# Patient Record
Sex: FEMALE | Race: WHITE | NOT HISPANIC OR LATINO
[De-identification: names, ages, dates, MRNs, and addresses within clinical notes are randomized per-mention and may not be internally consistent; named-entity substitution may affect disease eponyms.]

---

## 2022-04-14 VITALS
WEIGHT: 238.1 LBS | HEART RATE: 70 BPM | HEIGHT: 69 IN | TEMPERATURE: 97.3 F | DIASTOLIC BLOOD PRESSURE: 70 MMHG | BODY MASS INDEX: 35.27 KG/M2 | SYSTOLIC BLOOD PRESSURE: 120 MMHG

## 2022-08-31 ENCOUNTER — APPOINTMENT (OUTPATIENT)
Dept: CARDIOLOGY | Facility: CLINIC | Age: 72
End: 2022-08-31

## 2022-08-31 PROCEDURE — 93880 EXTRACRANIAL BILAT STUDY: CPT

## 2022-09-02 ENCOUNTER — APPOINTMENT (OUTPATIENT)
Dept: CARDIOLOGY | Facility: CLINIC | Age: 72
End: 2022-09-02

## 2022-09-02 PROCEDURE — 93306 TTE W/DOPPLER COMPLETE: CPT

## 2022-09-13 ENCOUNTER — APPOINTMENT (OUTPATIENT)
Dept: CARDIOLOGY | Facility: CLINIC | Age: 72
End: 2022-09-13

## 2022-09-13 VITALS — HEIGHT: 69 IN | BODY MASS INDEX: 35.33 KG/M2 | WEIGHT: 238.56 LBS

## 2022-09-13 PROCEDURE — 99205 OFFICE O/P NEW HI 60 MIN: CPT

## 2022-09-26 ENCOUNTER — NON-APPOINTMENT (OUTPATIENT)
Age: 72
End: 2022-09-26

## 2022-09-26 DIAGNOSIS — I44.60 UNSPECIFIED FASCICULAR BLOCK: ICD-10-CM

## 2022-11-21 ENCOUNTER — NON-APPOINTMENT (OUTPATIENT)
Age: 72
End: 2022-11-21

## 2022-11-21 DIAGNOSIS — A00.9 CHOLERA, UNSPECIFIED: ICD-10-CM

## 2022-11-21 DIAGNOSIS — Z87.891 PERSONAL HISTORY OF NICOTINE DEPENDENCE: ICD-10-CM

## 2023-01-25 ENCOUNTER — APPOINTMENT (OUTPATIENT)
Dept: CARDIOLOGY | Facility: CLINIC | Age: 73
End: 2023-01-25
Payer: MEDICARE

## 2023-01-25 VITALS — BODY MASS INDEX: 34.07 KG/M2 | HEIGHT: 69 IN | WEIGHT: 230 LBS

## 2023-01-25 PROCEDURE — 99214 OFFICE O/P EST MOD 30 MIN: CPT

## 2023-01-25 NOTE — HISTORY OF PRESENT ILLNESS
[FreeTextEntry1] : Pt with HTN, VENOUS INSUFFIENCY AND CAD OSTIAL LCX 20%, MILD AORTIC STENOSIS, ORTHOSTATIC HYPOTENSION IN THE PAST,  PPM 2010, Restrictive lung Disease, DD2 2022, CORY. \par STATIN MYALGIA ON PRALUENT \par \par pt with poison ivy had been on steroids and problem all summer, pt with grade 2 dd and says not sob, pt not very active for the last 2 months due ot heat. \par pt denies dizziness in june and told due to inner ear. \par \par LDL 95 ON PRALUENT \par 9/2/22: lvef 60%, dd1, severe LAE, severe calcified PMVL mild MR, E brianna: 2.1 m/s TR brianna: 2.9 m/s MILD AS. \par 8/22: > 70% LICA, 50-69% ROXANNE\par \par pt did not get CTA yet and called a couple times and not sure why did not get CTA. pt has mild AS before and murmur. pt says bp high with PMD and bp at home was 136/67 and bp now in office 130/70. \par pt did not change medications. \par \par pt had a rash to praluent shot, pt gets a cellulitis from it, pt found it is from praluent. \par LDL 95 but took last praluent dose 1 week prior. PT STOPPED PRALUENT AND RASH WENT AWAY, ITCHY.

## 2023-01-25 NOTE — PHYSICAL EXAM
[Normal S1, S2] : normal S1, S2 [Murmur] : murmur [Clear Lung Fields] : clear lung fields [de-identified] : GERARDO

## 2023-01-25 NOTE — PHYSICAL EXAM
[Normal S1, S2] : normal S1, S2 [Murmur] : murmur [Clear Lung Fields] : clear lung fields [de-identified] : GERARDO

## 2023-01-25 NOTE — CARDIOLOGY SUMMARY
[de-identified] : 9/2/22: lvef 60%, dd1, severe LAE, severe calcified PMVL mild MR, E ve: 2.1 m/s TR brianna: 2.9 m/s MILD AS.

## 2023-01-25 NOTE — CARDIOLOGY SUMMARY
[de-identified] : 9/2/22: lvef 60%, dd1, severe LAE, severe calcified PMVL mild MR, E ve: 2.1 m/s TR brianna: 2.9 m/s MILD AS.

## 2023-01-25 NOTE — DISCUSSION/SUMMARY
[FreeTextEntry1] : pt with carotid disease, will get CTA and send to dr. french if significant. \par START ZETIA AS STILL HIGH ON PRALUENT

## 2023-01-25 NOTE — DISCUSSION/SUMMARY
[FreeTextEntry1] : pt with carotid disease, will get CTA and send to dr. french if significant. \par START ZETIA AS STILL HIGH ON PRALUENT \par \par pt diD not start ezetimbe, \par HAD RASH WITH PRALUENT PROVEN WITH DERMATOLOGY, \par statin myalgia was reason pt was on praluent \par will try to get leqvio as CAD and carotid dz \par will need CTA of carotids. \par

## 2023-01-25 NOTE — HISTORY OF PRESENT ILLNESS
[FreeTextEntry1] : Pt with HTN, VENOUS INSUFFIENCY AND CAD OSTIAL LCX 20%, MILD AORTIC STENOSIS, ORTHOSTATIC HYPOTENSION IN THE PAST,  PPM 2010, Restrictive lung Disease, DD2 2022, CORY. \par STATIN MYALGIA ON PRALUENT \par \par pt with poison ivy had been on steroids and problem all summer, pt with grade 2 dd and says not sob, pt not very active for the last 2 months due ot heat. \par pt denies dizziness in june and told due to inner ear. \par \par LDL 95 ON PRALUENT \par 9/2/22: lvef 60%, dd1, severe LAE, severe calcified PMVL mild MR, E brianna: 2.1 m/s TR brianna: 2.9 m/s MILD AS. \par 8/22: > 70% LICA, 50-69% ROXANNE

## 2023-02-02 ENCOUNTER — NON-APPOINTMENT (OUTPATIENT)
Age: 73
End: 2023-02-02

## 2023-02-06 ENCOUNTER — OUTPATIENT (OUTPATIENT)
Dept: OUTPATIENT SERVICES | Facility: HOSPITAL | Age: 73
LOS: 1 days | End: 2023-02-06
Payer: MEDICARE

## 2023-02-06 DIAGNOSIS — I25.10 ATHEROSCLEROTIC HEART DISEASE OF NATIVE CORONARY ARTERY WITHOUT ANGINA PECTORIS: ICD-10-CM

## 2023-02-06 DIAGNOSIS — I65.23 OCCLUSION AND STENOSIS OF BILATERAL CAROTID ARTERIES: ICD-10-CM

## 2023-02-06 DIAGNOSIS — Z00.8 ENCOUNTER FOR OTHER GENERAL EXAMINATION: ICD-10-CM

## 2023-02-06 PROCEDURE — 70498 CT ANGIOGRAPHY NECK: CPT | Mod: 26

## 2023-02-06 PROCEDURE — 70498 CT ANGIOGRAPHY NECK: CPT

## 2023-02-07 DIAGNOSIS — I25.10 ATHEROSCLEROTIC HEART DISEASE OF NATIVE CORONARY ARTERY WITHOUT ANGINA PECTORIS: ICD-10-CM

## 2023-02-07 DIAGNOSIS — I65.23 OCCLUSION AND STENOSIS OF BILATERAL CAROTID ARTERIES: ICD-10-CM

## 2023-03-13 ENCOUNTER — APPOINTMENT (OUTPATIENT)
Dept: CARDIOLOGY | Facility: CLINIC | Age: 73
End: 2023-03-13
Payer: MEDICARE

## 2023-03-13 VITALS
HEIGHT: 69 IN | WEIGHT: 236 LBS | BODY MASS INDEX: 34.96 KG/M2 | TEMPERATURE: 97.7 F | DIASTOLIC BLOOD PRESSURE: 80 MMHG | SYSTOLIC BLOOD PRESSURE: 140 MMHG | RESPIRATION RATE: 15 BRPM | HEART RATE: 62 BPM | OXYGEN SATURATION: 97 %

## 2023-03-13 PROCEDURE — 99204 OFFICE O/P NEW MOD 45 MIN: CPT

## 2023-03-13 RX ORDER — EVOLOCUMAB 140 MG/ML
140 INJECTION, SOLUTION SUBCUTANEOUS
Refills: 0 | Status: DISCONTINUED | COMMUNITY
End: 2023-03-13

## 2023-03-13 RX ORDER — ALIROCUMAB 150 MG/ML
150 INJECTION, SOLUTION SUBCUTANEOUS
Refills: 0 | Status: DISCONTINUED | COMMUNITY
End: 2023-03-13

## 2023-03-15 NOTE — CARDIOLOGY SUMMARY
[de-identified] : 9/2/22: lvef 60%, dd1, severe LAE, severe calcified PMVL mild MR, E ve: 2.1 m/s TR brianna: 2.9 m/s MILD AS.

## 2023-03-15 NOTE — PHYSICAL EXAM
[General Appearance - Well Developed] : well developed [Normal Conjunctiva] : the conjunctiva exhibited no abnormalities [Normal Oral Mucosa] : normal oral mucosa [Heart Rate And Rhythm] : heart rate and rhythm were normal [] : no respiratory distress [Bowel Sounds] : normal bowel sounds [FreeTextEntry1] : obese  [Abnormal Walk] : normal gait [Skin Color & Pigmentation] : normal skin color and pigmentation [Oriented To Time, Place, And Person] : oriented to person, place, and time [Normal S1, S2] : normal S1, S2 [Murmur] : murmur [Clear Lung Fields] : clear lung fields [de-identified] : GERARDO

## 2023-03-15 NOTE — HISTORY OF PRESENT ILLNESS
[FreeTextEntry1] : 72 y/o with h/o HTN, VENOUS INSUFFIENCY AND CAD OSTIAL LCX 20%, MILD AORTIC STENOSIS, \par Presents today for initial evaluation due to recently diagnosed carotid disease. Denies any neurologic Sx.   \par \par LDL 95 ON PRALUENT \par \par 9/2/22: lvef 60%, dd1, severe LAE, severe calcified PMVL mild MR, E brianna: 2.1 m/s TR brianna: 2.9 m/s MILD AS. \par 8/22: > 70% LICA, 50-69% ROXANNE\par \par

## 2023-03-15 NOTE — ASSESSMENT
[FreeTextEntry1] : 72 y/o with h/o HTN, VENOUS INSUFFIENCY AND CAD OSTIAL LCX 20%, MILD AORTIC STENOSIS, \par Presents today for initial evaluation due to recently diagnosed carotid disease. Denies any neurologic Sx.   \par \par LDL 95 ON PRALUENT \par \par 9/2/22: lvef 60%, dd1, severe LAE, severe calcified PMVL mild MR, E brianna: 2.1 m/s TR brianna: 2.9 m/s MILD AS. \par 8/22: > 70% LICA, 50-69% ROXANNE\par \par 2/23 CTA Carotids - B/l moderate disease  \par \par Plan:\par - Continue risk monitor mod.\par - Repeat carotid duplex in 6 month for progression \par - F/u with Dr Weaver

## 2023-05-23 NOTE — PHYSICAL EXAM
[Normal S1, S2] : normal S1, S2 [Murmur] : murmur [Clear Lung Fields] : clear lung fields [de-identified] : GERARDO

## 2023-05-23 NOTE — CARDIOLOGY SUMMARY
[de-identified] : 9/2/22: lvef 60%, dd1, severe LAE, severe calcified PMVL mild MR, E ve: 2.1 m/s TR brianna: 2.9 m/s MILD AS.  [de-identified] : 2/7/23: CT  ANGIO NECK: GREATER THAN 70% STENOSIS

## 2023-05-26 ENCOUNTER — APPOINTMENT (OUTPATIENT)
Dept: CARDIOLOGY | Facility: CLINIC | Age: 73
End: 2023-05-26
Payer: MEDICARE

## 2023-06-22 ENCOUNTER — APPOINTMENT (OUTPATIENT)
Dept: CARDIOLOGY | Facility: CLINIC | Age: 73
End: 2023-06-22
Payer: MEDICARE

## 2023-06-22 VITALS — BODY MASS INDEX: 34.51 KG/M2 | WEIGHT: 233 LBS | HEIGHT: 69 IN

## 2023-06-22 DIAGNOSIS — I50.30 UNSPECIFIED DIASTOLIC (CONGESTIVE) HEART FAILURE: ICD-10-CM

## 2023-06-22 PROCEDURE — 99214 OFFICE O/P EST MOD 30 MIN: CPT

## 2023-07-14 RX ORDER — TELMISARTAN 80 MG/1
80 TABLET ORAL DAILY
Qty: 90 | Refills: 3 | Status: ACTIVE | COMMUNITY
Start: 1900-01-01 | End: 1900-01-01

## 2023-07-14 RX ORDER — HYDROCHLOROTHIAZIDE 25 MG/1
25 TABLET ORAL DAILY
Qty: 90 | Refills: 3 | Status: ACTIVE | COMMUNITY
Start: 1900-01-01 | End: 1900-01-01

## 2023-07-21 RX ORDER — AMLODIPINE BESYLATE 10 MG/1
10 TABLET ORAL DAILY
Qty: 90 | Refills: 3 | Status: ACTIVE | COMMUNITY
Start: 1900-01-01 | End: 1900-01-01

## 2023-07-31 NOTE — HISTORY OF PRESENT ILLNESS
[FreeTextEntry1] : Pt with HTN, VENOUS INSUFFIENCY AND CAD OSTIAL LCX 20%, MILD AORTIC STENOSIS, ORTHOSTATIC HYPOTENSION IN THE PAST, Restrictive lung Disease, DD2 , CORY.  STATIN MYALGIA ON PRALUENT  EPS  PPM NOT NEEDED.    LDL 95 ON PRALUENT  22: lvef 60%, dd1, severe LAE, severe calcified PMVL mild MR, E brianna: 2.1 m/s TR brianna: 2.9 m/s MILD AS.  : > 70% LICA, 50-69% ROXANNE  pt did not get CTA yet and called a couple times and not sure why did not get CTA. pt has mild AS before and murmur. pt says bp high with PMD and bp at home was 136/67 and bp now in office 130/70.  pt did not change medications.   pt had a rash to praluent shot, pt gets a cellulitis from it, pt found it is from praluent.  LDL 95 but took last praluent dose 1 week prior. PT STOPPED PRALUENT AND RASH WENT AWAY, ITCHY.   23: 23: CT  ANGIO NECK: GREATER THAN 70% STENOSIS  saw dr. french to follow up carotid to see if worsening and progression, medical management.  pt was started on leqvio.  GFR: 61 hdl 39 LDl 67 Patient denies cp. Patient states she walks about an hour 3x/ week but on level ground.  pt had 2 shots of leqvio and due to get next dose LEQVIO IN NOVEMBER her 6 month dose.   23: NTBNP: 161, T, HDL: 36, LDL: 66, BUN: 33, CR: 1.12, GFR: 52

## 2023-07-31 NOTE — PHYSICAL EXAM
[Normal S1, S2] : normal S1, S2 [Murmur] : murmur [Clear Lung Fields] : clear lung fields [Carotid Bruit] : carotid bruit [de-identified] : left side  [de-identified] : GERARDO

## 2023-07-31 NOTE — DISCUSSION/SUMMARY
[FreeTextEntry1] : HAD RASH WITH PRALUENT PROVEN WITH DERMATOLOGY, \par statin myalgia was reason pt was on praluent \par \par 6/22/23:\par Blood work before next visit\par Carotid every 6 months\par Continue Leqvio, get  2 d echo \par \par

## 2023-07-31 NOTE — CARDIOLOGY SUMMARY
[de-identified] : 9/2/22: lvef 60%, dd1, severe LAE, severe calcified PMVL mild MR, E ve: 2.1 m/s TR brianna: 2.9 m/s MILD AS.  [de-identified] : 2/7/23: CT  ANGIO NECK: GREATER THAN 70% STENOSIS

## 2023-09-22 ENCOUNTER — APPOINTMENT (OUTPATIENT)
Dept: CARDIOLOGY | Facility: CLINIC | Age: 73
End: 2023-09-22
Payer: MEDICARE

## 2023-09-22 VITALS
HEIGHT: 69 IN | DIASTOLIC BLOOD PRESSURE: 76 MMHG | HEART RATE: 89 BPM | OXYGEN SATURATION: 98 % | BODY MASS INDEX: 34.36 KG/M2 | SYSTOLIC BLOOD PRESSURE: 140 MMHG | WEIGHT: 232 LBS

## 2023-09-22 PROCEDURE — 99214 OFFICE O/P EST MOD 30 MIN: CPT

## 2023-09-22 PROCEDURE — 93880 EXTRACRANIAL BILAT STUDY: CPT

## 2023-09-25 RX ORDER — CARVEDILOL 12.5 MG/1
12.5 TABLET, FILM COATED ORAL TWICE DAILY
Qty: 180 | Refills: 3 | Status: ACTIVE | COMMUNITY
Start: 1900-01-01 | End: 1900-01-01

## 2023-09-26 ENCOUNTER — APPOINTMENT (OUTPATIENT)
Dept: CARDIOLOGY | Facility: CLINIC | Age: 73
End: 2023-09-26
Payer: MEDICARE

## 2023-09-26 PROCEDURE — 93306 TTE W/DOPPLER COMPLETE: CPT

## 2023-11-02 ENCOUNTER — APPOINTMENT (OUTPATIENT)
Dept: CARDIOLOGY | Facility: CLINIC | Age: 73
End: 2023-11-02
Payer: MEDICARE

## 2023-11-02 VITALS — WEIGHT: 230 LBS | BODY MASS INDEX: 34.07 KG/M2 | HEIGHT: 69 IN

## 2023-11-02 PROCEDURE — 99214 OFFICE O/P EST MOD 30 MIN: CPT

## 2023-11-02 RX ORDER — EMPAGLIFLOZIN 10 MG/1
10 TABLET, FILM COATED ORAL DAILY
Qty: 90 | Refills: 3 | Status: ACTIVE | COMMUNITY
Start: 2023-11-02 | End: 1900-01-01

## 2024-01-22 ENCOUNTER — RX RENEWAL (OUTPATIENT)
Age: 74
End: 2024-01-22

## 2024-01-22 RX ORDER — EZETIMIBE 10 MG/1
10 TABLET ORAL
Qty: 90 | Refills: 3 | Status: ACTIVE | COMMUNITY
Start: 2023-01-25 | End: 1900-01-01

## 2024-02-28 PROBLEM — I87.2 DEEP VENOUS INSUFFICIENCY: Status: ACTIVE | Noted: 2022-09-13

## 2024-02-29 ENCOUNTER — APPOINTMENT (OUTPATIENT)
Dept: CARDIOLOGY | Facility: CLINIC | Age: 74
End: 2024-02-29
Payer: MEDICARE

## 2024-02-29 VITALS
SYSTOLIC BLOOD PRESSURE: 120 MMHG | DIASTOLIC BLOOD PRESSURE: 70 MMHG | BODY MASS INDEX: 33.23 KG/M2 | HEART RATE: 65 BPM | WEIGHT: 225 LBS

## 2024-02-29 VITALS — WEIGHT: 232 LBS | BODY MASS INDEX: 34.36 KG/M2 | HEIGHT: 69 IN

## 2024-02-29 DIAGNOSIS — I87.2 VENOUS INSUFFICIENCY (CHRONIC) (PERIPHERAL): ICD-10-CM

## 2024-02-29 DIAGNOSIS — Z00.00 ENCOUNTER FOR GENERAL ADULT MEDICAL EXAMINATION W/OUT ABNORMAL FINDINGS: ICD-10-CM

## 2024-02-29 PROCEDURE — 99214 OFFICE O/P EST MOD 30 MIN: CPT

## 2024-02-29 NOTE — DISCUSSION/SUMMARY
[FreeTextEntry1] : HAD RASH WITH PRALUENT PROVEN WITH DERMATOLOGY,  statin myalgia was reason pt was on praluent  Continue leqvio ( next dose 6/24)  Carotid every 6 months greater than 70%, f/u dr french  Continue Leqvio continue carvedilol  continue ezetimbe   continue amlodipine  continue jardiance 10 mg po qd, if expensive don't take and will f/u  get bloodwork  f/u in 4 months get carotid every 6 months

## 2024-02-29 NOTE — PHYSICAL EXAM
[Carotid Bruit] : carotid bruit [Normal S1, S2] : normal S1, S2 [Murmur] : murmur [Clear Lung Fields] : clear lung fields [de-identified] : left side  [de-identified] : GERARDO

## 2024-02-29 NOTE — HISTORY OF PRESENT ILLNESS
[FreeTextEntry1] : Pt with HTN, VENOUS INSUFFIENCY AND CAD OSTIAL LCX 20%, MILD AORTIC STENOSIS, ORTHOSTATIC HYPOTENSION IN THE PAST, Restrictive lung Disease, DD2 , CORY.  CAROTID DZ, ELEVATED LPa  STATIN MYALGIA ON PRALUENT  EPS  PPM NOT NEEDED.   LDL 95 ON PRALUENT  22: lvef 60%, dd1, severe LAE, severe calcified PMVL mild MR, E brianna: 2.1 m/s TR brianna: 2.9 m/s MILD AS.  : > 70% LICA, 50-69% ROXANNE   LDL 95 but took last praluent dose 1 week prior. PT STOPPED PRALUENT AND RASH WENT AWAY,  23: 23: CT  ANGIO NECK: GREATER THAN 70% STENOSIS  saw dr. french to follow up carotid to see if worsening and progression, medical management.  pt was started on leqvio.  GFR: 61 hdl 39 LDl 67 Patient denies cp. Patient states she walks about an hour 3x/ week but on level ground.  pt had 2 shots of leqvio and due to get next dose LEQVIO IN NOVEMBER her 6 month dose.   23: NTBNP: 161, T, HDL: 36, LDL: 66, BUN: 33, CR: 1.12, GFR: 52  23: 10/5/23: T, HDL: 36, LDL: 87, LPa: 112, APOB: 85 23: Right: proximal ICA 20-49% stenosis. Moderate intimal calcified atherosclerosis right bifurcation. Moderate to severe disease LICA bifurcation. Left: proximal ICA >70% stenosis.  23: EF: 50-55%, E/e': 21, CO: 7.8, DD1, mod LAE, mild SHANE, mild MR pt getting leqvio with [facility] in november.  pt to get shot in office when picks up. pt only sob if fast exertion. pt with 3rd injection of leqvio in november.   24: pt had Levio dose done  repeat every 6 months patient to call in May/Bhumi  2/24: GFR: 59 HDL 35 LDL 63 improved, A1c; 5.5 BNP: 287 started Jardiance 10 and not having any issues Patient just recovered from shingles so patient was not as active. pt not sob but not active will try to exercise.

## 2024-02-29 NOTE — CARDIOLOGY SUMMARY
[de-identified] : 9/2/22: lvef 60%, dd1, severe LAE, severe calcified PMVL mild MR, E ve: 2.1 m/s TR brianna: 2.9 m/s MILD AS.  [de-identified] : 2/7/23: CT  ANGIO NECK: GREATER THAN 70% STENOSIS

## 2024-04-03 ENCOUNTER — APPOINTMENT (OUTPATIENT)
Dept: CARDIOLOGY | Facility: CLINIC | Age: 74
End: 2024-04-03
Payer: MEDICARE

## 2024-04-03 PROCEDURE — 93880 EXTRACRANIAL BILAT STUDY: CPT

## 2024-06-21 RX ORDER — INCLISIRAN 284 MG/1.5ML
284 INJECTION, SOLUTION SUBCUTANEOUS
Qty: 2 | Refills: 1 | Status: ACTIVE | COMMUNITY
Start: 2023-01-25 | End: 1900-01-01

## 2024-06-26 ENCOUNTER — NON-APPOINTMENT (OUTPATIENT)
Age: 74
End: 2024-06-26

## 2024-06-27 PROBLEM — I10 HYPERTENSION, UNSPECIFIED TYPE: Status: ACTIVE | Noted: 2022-09-13

## 2024-06-27 PROBLEM — E78.2 MIXED HYPERLIPIDEMIA: Status: ACTIVE | Noted: 2022-09-13

## 2024-06-27 PROBLEM — I65.29 CAROTID STENOSIS: Status: ACTIVE | Noted: 2023-03-15

## 2024-06-27 PROBLEM — I50.32 CHRONIC DIASTOLIC HEART FAILURE: Status: ACTIVE | Noted: 2022-09-13

## 2024-06-27 PROBLEM — I65.23 ARTERIOSCLEROSIS OF BOTH CAROTID ARTERIES: Status: ACTIVE | Noted: 2022-09-13

## 2024-06-27 PROBLEM — R55 SYNCOPE AND COLLAPSE: Status: ACTIVE | Noted: 2022-09-26

## 2024-06-27 PROBLEM — I25.10 CAD (CORONARY ARTERY DISEASE): Status: ACTIVE | Noted: 2022-09-13

## 2024-06-27 PROBLEM — I51.7 LEFT ATRIAL ENLARGEMENT: Status: ACTIVE | Noted: 2023-01-25

## 2024-06-27 PROBLEM — I95.1 ORTHOSTATIC INTOLERANCE: Status: ACTIVE | Noted: 2022-09-13

## 2024-06-27 PROBLEM — I44.60 LEFT BUNDLE BRANCH HEMIBLOCK: Status: ACTIVE | Noted: 2022-11-21

## 2024-06-28 ENCOUNTER — APPOINTMENT (OUTPATIENT)
Dept: CARDIOLOGY | Facility: CLINIC | Age: 74
End: 2024-06-28
Payer: MEDICARE

## 2024-06-28 VITALS — WEIGHT: 226 LBS | HEIGHT: 69 IN | BODY MASS INDEX: 33.47 KG/M2

## 2024-06-28 DIAGNOSIS — I50.32 CHRONIC DIASTOLIC (CONGESTIVE) HEART FAILURE: ICD-10-CM

## 2024-06-28 DIAGNOSIS — I44.60 UNSPECIFIED FASCICULAR BLOCK: ICD-10-CM

## 2024-06-28 DIAGNOSIS — E78.2 MIXED HYPERLIPIDEMIA: ICD-10-CM

## 2024-06-28 DIAGNOSIS — I10 ESSENTIAL (PRIMARY) HYPERTENSION: ICD-10-CM

## 2024-06-28 DIAGNOSIS — R55 SYNCOPE AND COLLAPSE: ICD-10-CM

## 2024-06-28 DIAGNOSIS — I95.1 ORTHOSTATIC HYPOTENSION: ICD-10-CM

## 2024-06-28 DIAGNOSIS — I65.23 OCCLUSION AND STENOSIS OF BILATERAL CAROTID ARTERIES: ICD-10-CM

## 2024-06-28 DIAGNOSIS — I65.29 OCCLUSION AND STENOSIS OF UNSPECIFIED CAROTID ARTERY: ICD-10-CM

## 2024-06-28 DIAGNOSIS — I51.7 CARDIOMEGALY: ICD-10-CM

## 2024-06-28 DIAGNOSIS — I25.10 ATHEROSCLEROTIC HEART DISEASE OF NATIVE CORONARY ARTERY W/OUT ANGINA PECTORIS: ICD-10-CM

## 2024-06-28 PROCEDURE — 99214 OFFICE O/P EST MOD 30 MIN: CPT

## 2024-06-28 PROCEDURE — 93000 ELECTROCARDIOGRAM COMPLETE: CPT

## 2024-06-28 PROCEDURE — G2211 COMPLEX E/M VISIT ADD ON: CPT

## 2024-07-16 ENCOUNTER — RX RENEWAL (OUTPATIENT)
Age: 74
End: 2024-07-16

## 2024-09-20 ENCOUNTER — APPOINTMENT (OUTPATIENT)
Dept: CARDIOLOGY | Facility: CLINIC | Age: 74
End: 2024-09-20

## 2024-09-20 PROCEDURE — 93880 EXTRACRANIAL BILAT STUDY: CPT

## 2024-10-15 ENCOUNTER — APPOINTMENT (OUTPATIENT)
Dept: CARDIOLOGY | Facility: CLINIC | Age: 74
End: 2024-10-15

## 2024-10-15 PROCEDURE — 93306 TTE W/DOPPLER COMPLETE: CPT

## 2024-10-29 ENCOUNTER — APPOINTMENT (OUTPATIENT)
Dept: CARDIOLOGY | Facility: CLINIC | Age: 74
End: 2024-10-29
Payer: MEDICARE

## 2024-10-29 ENCOUNTER — NON-APPOINTMENT (OUTPATIENT)
Age: 74
End: 2024-10-29

## 2024-10-29 VITALS
WEIGHT: 220 LBS | SYSTOLIC BLOOD PRESSURE: 124 MMHG | OXYGEN SATURATION: 98 % | HEART RATE: 67 BPM | BODY MASS INDEX: 32.58 KG/M2 | DIASTOLIC BLOOD PRESSURE: 72 MMHG | HEIGHT: 69 IN

## 2024-10-29 DIAGNOSIS — I25.10 ATHEROSCLEROTIC HEART DISEASE OF NATIVE CORONARY ARTERY W/OUT ANGINA PECTORIS: ICD-10-CM

## 2024-10-29 DIAGNOSIS — E78.2 MIXED HYPERLIPIDEMIA: ICD-10-CM

## 2024-10-29 DIAGNOSIS — R55 SYNCOPE AND COLLAPSE: ICD-10-CM

## 2024-10-29 DIAGNOSIS — I51.7 CARDIOMEGALY: ICD-10-CM

## 2024-10-29 DIAGNOSIS — I50.32 CHRONIC DIASTOLIC (CONGESTIVE) HEART FAILURE: ICD-10-CM

## 2024-10-29 DIAGNOSIS — I10 ESSENTIAL (PRIMARY) HYPERTENSION: ICD-10-CM

## 2024-10-29 DIAGNOSIS — I65.23 OCCLUSION AND STENOSIS OF BILATERAL CAROTID ARTERIES: ICD-10-CM

## 2024-10-29 PROCEDURE — G2211 COMPLEX E/M VISIT ADD ON: CPT

## 2024-10-29 PROCEDURE — 99214 OFFICE O/P EST MOD 30 MIN: CPT

## 2024-10-30 ENCOUNTER — RX RENEWAL (OUTPATIENT)
Age: 74
End: 2024-10-30

## 2024-11-15 ENCOUNTER — RX RENEWAL (OUTPATIENT)
Age: 74
End: 2024-11-15

## 2024-11-27 ENCOUNTER — APPOINTMENT (OUTPATIENT)
Dept: CARDIOLOGY | Facility: CLINIC | Age: 74
End: 2024-11-27
Payer: MEDICARE

## 2024-11-27 VITALS
WEIGHT: 220 LBS | HEIGHT: 69 IN | SYSTOLIC BLOOD PRESSURE: 109 MMHG | BODY MASS INDEX: 32.58 KG/M2 | HEART RATE: 62 BPM | DIASTOLIC BLOOD PRESSURE: 67 MMHG

## 2024-11-27 DIAGNOSIS — I65.23 OCCLUSION AND STENOSIS OF BILATERAL CAROTID ARTERIES: ICD-10-CM

## 2024-11-27 DIAGNOSIS — I10 ESSENTIAL (PRIMARY) HYPERTENSION: ICD-10-CM

## 2024-11-27 DIAGNOSIS — I25.10 ATHEROSCLEROTIC HEART DISEASE OF NATIVE CORONARY ARTERY W/OUT ANGINA PECTORIS: ICD-10-CM

## 2024-11-27 PROCEDURE — 99214 OFFICE O/P EST MOD 30 MIN: CPT

## 2024-12-31 ENCOUNTER — NON-APPOINTMENT (OUTPATIENT)
Age: 74
End: 2024-12-31

## 2025-05-13 ENCOUNTER — NON-APPOINTMENT (OUTPATIENT)
Age: 75
End: 2025-05-13

## 2025-05-14 ENCOUNTER — NON-APPOINTMENT (OUTPATIENT)
Age: 75
End: 2025-05-14

## 2025-07-03 LAB
ALBUMIN SERPL ELPH-MCNC: 4.3 G/DL
ALP BLD-CCNC: 71 U/L
ALT SERPL-CCNC: 14 U/L
ANION GAP SERPL CALC-SCNC: 11 MMOL/L
AST SERPL-CCNC: 14 U/L
BILIRUB SERPL-MCNC: 0.7 MG/DL
BUN SERPL-MCNC: 25 MG/DL
CALCIUM SERPL-MCNC: 9.9 MG/DL
CHLORIDE SERPL-SCNC: 103 MMOL/L
CHOLEST SERPL-MCNC: 140 MG/DL
CO2 SERPL-SCNC: 26 MMOL/L
CREAT SERPL-MCNC: 1.1 MG/DL
EGFRCR SERPLBLD CKD-EPI 2021: 53 ML/MIN/1.73M2
ESTIMATED AVERAGE GLUCOSE: 120 MG/DL
GLUCOSE SERPL-MCNC: 91 MG/DL
HBA1C MFR BLD HPLC: 5.8 %
HCT VFR BLD CALC: 39.1 %
HDLC SERPL-MCNC: 37 MG/DL
HGB BLD-MCNC: 13.2 G/DL
LDLC SERPL-MCNC: 87 MG/DL
MCHC RBC-ENTMCNC: 32.7 PG
MCHC RBC-ENTMCNC: 33.8 G/DL
MCV RBC AUTO: 96.8 FL
NONHDLC SERPL-MCNC: 103 MG/DL
NT-PROBNP SERPL-MCNC: 231 PG/ML
PLATELET # BLD AUTO: 264 K/UL
PMV BLD AUTO: 0 /100 WBCS
PMV BLD: 8.8 FL
POTASSIUM SERPL-SCNC: 4.7 MMOL/L
PROT SERPL-MCNC: 6.6 G/DL
RBC # BLD: 4.04 M/UL
RBC # FLD: 12.5 %
SODIUM SERPL-SCNC: 140 MMOL/L
TRIGL SERPL-MCNC: 79 MG/DL
TSH SERPL-ACNC: 2.09 UIU/ML
WBC # FLD AUTO: 5.45 K/UL

## 2025-07-17 ENCOUNTER — APPOINTMENT (OUTPATIENT)
Dept: CARDIOLOGY | Facility: CLINIC | Age: 75
End: 2025-07-17

## 2025-08-07 ENCOUNTER — APPOINTMENT (OUTPATIENT)
Dept: CARDIOLOGY | Facility: CLINIC | Age: 75
End: 2025-08-07
Payer: MEDICARE

## 2025-08-07 VITALS
HEART RATE: 65 BPM | WEIGHT: 221 LBS | BODY MASS INDEX: 32.73 KG/M2 | DIASTOLIC BLOOD PRESSURE: 74 MMHG | SYSTOLIC BLOOD PRESSURE: 124 MMHG | HEIGHT: 69 IN

## 2025-08-07 DIAGNOSIS — E78.2 MIXED HYPERLIPIDEMIA: ICD-10-CM

## 2025-08-07 DIAGNOSIS — I10 ESSENTIAL (PRIMARY) HYPERTENSION: ICD-10-CM

## 2025-08-07 DIAGNOSIS — I87.2 VENOUS INSUFFICIENCY (CHRONIC) (PERIPHERAL): ICD-10-CM

## 2025-08-07 DIAGNOSIS — I51.7 CARDIOMEGALY: ICD-10-CM

## 2025-08-07 DIAGNOSIS — I25.10 ATHEROSCLEROTIC HEART DISEASE OF NATIVE CORONARY ARTERY W/OUT ANGINA PECTORIS: ICD-10-CM

## 2025-08-07 DIAGNOSIS — I65.23 OCCLUSION AND STENOSIS OF BILATERAL CAROTID ARTERIES: ICD-10-CM

## 2025-08-07 DIAGNOSIS — R55 SYNCOPE AND COLLAPSE: ICD-10-CM

## 2025-08-07 DIAGNOSIS — I50.32 CHRONIC DIASTOLIC (CONGESTIVE) HEART FAILURE: ICD-10-CM

## 2025-08-07 DIAGNOSIS — I44.60 UNSPECIFIED FASCICULAR BLOCK: ICD-10-CM

## 2025-08-07 DIAGNOSIS — I65.29 OCCLUSION AND STENOSIS OF UNSPECIFIED CAROTID ARTERY: ICD-10-CM

## 2025-08-07 PROCEDURE — G2211 COMPLEX E/M VISIT ADD ON: CPT

## 2025-08-07 PROCEDURE — 93000 ELECTROCARDIOGRAM COMPLETE: CPT

## 2025-08-07 PROCEDURE — 99214 OFFICE O/P EST MOD 30 MIN: CPT

## 2025-08-11 ENCOUNTER — RX RENEWAL (OUTPATIENT)
Age: 75
End: 2025-08-11

## 2025-08-11 RX ORDER — METOPROLOL TARTRATE 25 MG/1
25 TABLET ORAL
Qty: 2 | Refills: 0 | Status: ACTIVE | COMMUNITY
Start: 2025-08-07 | End: 1900-01-01